# Patient Record
Sex: FEMALE | Race: WHITE | URBAN - METROPOLITAN AREA
[De-identification: names, ages, dates, MRNs, and addresses within clinical notes are randomized per-mention and may not be internally consistent; named-entity substitution may affect disease eponyms.]

---

## 2020-01-15 ENCOUNTER — APPOINTMENT (OUTPATIENT)
Dept: URBAN - METROPOLITAN AREA CLINIC 189 | Age: 50
Setting detail: DERMATOLOGY
End: 2020-01-15

## 2020-01-15 DIAGNOSIS — D485 NEOPLASM OF UNCERTAIN BEHAVIOR OF SKIN: ICD-10-CM

## 2020-01-15 DIAGNOSIS — L90.5 SCAR CONDITIONS AND FIBROSIS OF SKIN: ICD-10-CM

## 2020-01-15 PROBLEM — D48.5 NEOPLASM OF UNCERTAIN BEHAVIOR OF SKIN: Status: ACTIVE | Noted: 2020-01-15

## 2020-01-15 PROCEDURE — 11103 TANGNTL BX SKIN EA SEP/ADDL: CPT

## 2020-01-15 PROCEDURE — OTHER MIPS QUALITY: OTHER

## 2020-01-15 PROCEDURE — OTHER BIOPSY BY SHAVE METHOD: OTHER

## 2020-01-15 PROCEDURE — 99241: CPT | Mod: 25

## 2020-01-15 PROCEDURE — OTHER COUNSELING: OTHER

## 2020-01-15 PROCEDURE — 11102 TANGNTL BX SKIN SINGLE LES: CPT

## 2020-01-15 ASSESSMENT — LOCATION ZONE DERM
LOCATION ZONE: TRUNK
LOCATION ZONE: LEG

## 2020-01-15 ASSESSMENT — LOCATION SIMPLE DESCRIPTION DERM
LOCATION SIMPLE: CHEST
LOCATION SIMPLE: LEFT UPPER BACK
LOCATION SIMPLE: RIGHT THIGH
LOCATION SIMPLE: LEFT THIGH

## 2020-01-15 ASSESSMENT — LOCATION DETAILED DESCRIPTION DERM
LOCATION DETAILED: LEFT LATERAL SUPERIOR CHEST
LOCATION DETAILED: LEFT ANTERIOR DISTAL THIGH
LOCATION DETAILED: LEFT MEDIAL SUPERIOR CHEST
LOCATION DETAILED: RIGHT MEDIAL SUPERIOR CHEST
LOCATION DETAILED: LEFT SUPERIOR UPPER BACK
LOCATION DETAILED: RIGHT ANTERIOR PROXIMAL THIGH

## 2020-01-15 NOTE — PROCEDURE: BIOPSY BY SHAVE METHOD
Detail Level: Simple
Type Of Destruction Used: Electrodesiccation and Curettage
Bill 65305 For Specimen Handling/Conveyance To Laboratory?: no
Wound Care: Vaseline
Body Location Override (Optional - Billing Will Still Be Based On Selected Body Map Location If Applicable): L central chest
Additional Anesthesia Volume In Cc (Will Not Render If 0): 0
Notification Instructions: Patient will be notified of biopsy results. However, patient instructed to call the office if not contacted within 2 weeks.
Size Of Lesion In Cm: 0.9
Hemostasis: Aluminum Chloride and Electrocautery
Was A Bandage Applied: Yes
Billing Type: Third-Party Bill
Curettage Text: The wound bed was treated with curettage after the biopsy was performed.
Silver Nitrate Text: The wound bed was treated with silver nitrate after the biopsy was performed.
Anesthesia Volume In Cc (Will Not Render If 0): 0.5
Cryotherapy Text: The wound bed was treated with cryotherapy after the biopsy was performed.
Depth Of Biopsy: dermis
Biopsy Type: H and E
Consent: Written consent was obtained and risks were reviewed including but not limited to scarring, infection, bleeding, scabbing, incomplete removal, nerve damage and allergy to anesthesia.
Dressing: bandage
Electrodesiccation And Curettage Text: The wound bed was treated with electrodesiccation and curettage for three cycles after the biopsy was performed.
Electrodesiccation Text: The wound bed was treated with electrodesiccation after the biopsy was performed.
Body Location Override (Optional - Billing Will Still Be Based On Selected Body Map Location If Applicable): L lateral thigh
Post-Care Instructions: I reviewed with the patient in detail post-care instructions. Patient is to keep the biopsy site dry overnight, and then apply bacitracin twice daily until healed. Patient may apply hydrogen peroxide soaks to remove any crusting.
Hemostasis: Aluminum Chloride
Body Location Override (Optional - Billing Will Still Be Based On Selected Body Map Location If Applicable): R medial thigh
Biopsy Method: Personna blade
Size Of Lesion In Cm: 0.6
Anesthesia Type: 1% lidocaine with epinephrine and a 1:10 solution of 8.4% sodium bicarbonate
Body Location Override (Optional - Billing Will Still Be Based On Selected Body Map Location If Applicable): R central chest
Size Of Lesion In Cm: 0.7
Body Location Override (Optional - Billing Will Still Be Based On Selected Body Map Location If Applicable): Central chest

## 2020-02-27 ENCOUNTER — APPOINTMENT (OUTPATIENT)
Dept: URBAN - METROPOLITAN AREA CLINIC 189 | Age: 50
Setting detail: DERMATOLOGY
End: 2020-02-27

## 2020-02-27 PROBLEM — C44.519 BASAL CELL CARCINOMA OF SKIN OF OTHER PART OF TRUNK: Status: ACTIVE | Noted: 2020-02-27

## 2020-02-27 PROCEDURE — OTHER COUNSELING: OTHER

## 2020-02-27 PROCEDURE — OTHER EXCISION: OTHER

## 2020-02-27 PROCEDURE — 12032 INTMD RPR S/A/T/EXT 2.6-7.5: CPT

## 2020-02-27 PROCEDURE — OTHER MIPS QUALITY: OTHER

## 2020-02-27 PROCEDURE — 11601 EXC TR-EXT MAL+MARG 0.6-1 CM: CPT

## 2020-02-27 PROCEDURE — 11601 EXC TR-EXT MAL+MARG 0.6-1 CM: CPT | Mod: 76

## 2020-02-27 NOTE — PROCEDURE: EXCISION
Body Location Override (Optional - Billing Will Still Be Based On Selected Body Map Location If Applicable): L central chest

## 2020-02-27 NOTE — PROCEDURE: EXCISION
Body Location Override (Optional - Billing Will Still Be Based On Selected Body Map Location If Applicable): R central chest

## 2020-03-05 ENCOUNTER — APPOINTMENT (OUTPATIENT)
Dept: URBAN - METROPOLITAN AREA CLINIC 189 | Age: 50
Setting detail: DERMATOLOGY
End: 2020-03-05

## 2020-03-05 DIAGNOSIS — Z48.02 ENCOUNTER FOR REMOVAL OF SUTURES: ICD-10-CM

## 2020-03-05 PROCEDURE — OTHER SUTURE REMOVAL (GLOBAL PERIOD): OTHER

## 2020-03-05 PROCEDURE — 99024 POSTOP FOLLOW-UP VISIT: CPT

## 2020-03-05 PROCEDURE — OTHER COUNSELING: OTHER

## 2020-03-05 PROCEDURE — OTHER MIPS QUALITY: OTHER

## 2020-03-05 ASSESSMENT — LOCATION ZONE DERM: LOCATION ZONE: TRUNK

## 2020-03-05 ASSESSMENT — LOCATION SIMPLE DESCRIPTION DERM: LOCATION SIMPLE: CHEST

## 2020-03-05 ASSESSMENT — LOCATION DETAILED DESCRIPTION DERM
LOCATION DETAILED: LEFT LATERAL SUPERIOR CHEST
LOCATION DETAILED: RIGHT MEDIAL SUPERIOR CHEST

## 2020-03-05 NOTE — PROCEDURE: SUTURE REMOVAL (GLOBAL PERIOD)
Detail Level: Simple
Body Location Override (Optional - Billing Will Still Be Based On Selected Body Map Location If Applicable): R central chest
Add 40736 Cpt? (Important Note: In 2017 The Use Of 33115 Is Being Tracked By Cms To Determine Future Global Period Reimbursement For Global Periods): yes
Body Location Override (Optional - Billing Will Still Be Based On Selected Body Map Location If Applicable): L central chest

## 2020-06-04 ENCOUNTER — APPOINTMENT (OUTPATIENT)
Dept: URBAN - METROPOLITAN AREA CLINIC 189 | Age: 50
Setting detail: DERMATOLOGY
End: 2020-06-04

## 2020-06-04 DIAGNOSIS — L91.0 HYPERTROPHIC SCAR: ICD-10-CM

## 2020-06-04 PROBLEM — C44.719 BASAL CELL CARCINOMA OF SKIN OF LEFT LOWER LIMB, INCLUDING HIP: Status: ACTIVE | Noted: 2020-06-04

## 2020-06-04 PROBLEM — C44.519 BASAL CELL CARCINOMA OF SKIN OF OTHER PART OF TRUNK: Status: ACTIVE | Noted: 2020-06-04

## 2020-06-04 PROCEDURE — OTHER EXCISION: OTHER

## 2020-06-04 PROCEDURE — 11900 INJECT SKIN LESIONS </W 7: CPT | Mod: 59

## 2020-06-04 PROCEDURE — 11602 EXC TR-EXT MAL+MARG 1.1-2 CM: CPT | Mod: 76

## 2020-06-04 PROCEDURE — 12032 INTMD RPR S/A/T/EXT 2.6-7.5: CPT

## 2020-06-04 PROCEDURE — OTHER COUNSELING: OTHER

## 2020-06-04 PROCEDURE — 11602 EXC TR-EXT MAL+MARG 1.1-2 CM: CPT

## 2020-06-04 PROCEDURE — OTHER INTRALESIONAL KENALOG: OTHER

## 2020-06-04 PROCEDURE — OTHER MIPS QUALITY: OTHER

## 2020-06-04 ASSESSMENT — LOCATION SIMPLE DESCRIPTION DERM: LOCATION SIMPLE: CHEST

## 2020-06-04 ASSESSMENT — LOCATION DETAILED DESCRIPTION DERM
LOCATION DETAILED: RIGHT MEDIAL SUPERIOR CHEST
LOCATION DETAILED: LEFT LATERAL SUPERIOR CHEST

## 2020-06-04 ASSESSMENT — LOCATION ZONE DERM: LOCATION ZONE: TRUNK

## 2020-06-04 NOTE — PROCEDURE: EXCISION
Body Location Override (Optional - Billing Will Still Be Based On Selected Body Map Location If Applicable): L lateral thigh

## 2020-06-15 ENCOUNTER — APPOINTMENT (OUTPATIENT)
Dept: URBAN - METROPOLITAN AREA CLINIC 189 | Age: 50
Setting detail: DERMATOLOGY
End: 2020-06-15

## 2020-06-15 DIAGNOSIS — Z48.02 ENCOUNTER FOR REMOVAL OF SUTURES: ICD-10-CM

## 2020-06-15 PROCEDURE — 99024 POSTOP FOLLOW-UP VISIT: CPT

## 2020-06-15 PROCEDURE — OTHER SUTURE REMOVAL (GLOBAL PERIOD): OTHER

## 2020-06-15 PROCEDURE — OTHER MIPS QUALITY: OTHER

## 2020-06-15 PROCEDURE — OTHER COUNSELING: OTHER

## 2020-06-15 ASSESSMENT — LOCATION DETAILED DESCRIPTION DERM
LOCATION DETAILED: UPPER STERNUM
LOCATION DETAILED: LEFT ANTERIOR LATERAL PROXIMAL THIGH

## 2020-06-15 ASSESSMENT — LOCATION ZONE DERM
LOCATION ZONE: TRUNK
LOCATION ZONE: LEG

## 2020-06-15 ASSESSMENT — LOCATION SIMPLE DESCRIPTION DERM
LOCATION SIMPLE: CHEST
LOCATION SIMPLE: LEFT THIGH

## 2020-06-15 NOTE — PROCEDURE: SUTURE REMOVAL (GLOBAL PERIOD)
Add 92785 Cpt? (Important Note: In 2017 The Use Of 84024 Is Being Tracked By Cms To Determine Future Global Period Reimbursement For Global Periods): yes
Body Location Override (Optional - Billing Will Still Be Based On Selected Body Map Location If Applicable): Central chest
Body Location Override (Optional - Billing Will Still Be Based On Selected Body Map Location If Applicable): L lateral thigh
Detail Level: Simple

## 2020-06-25 ENCOUNTER — APPOINTMENT (OUTPATIENT)
Dept: URBAN - METROPOLITAN AREA CLINIC 189 | Age: 50
Setting detail: DERMATOLOGY
End: 2020-06-25

## 2020-06-25 DIAGNOSIS — Z48.02 ENCOUNTER FOR REMOVAL OF SUTURES: ICD-10-CM

## 2020-06-25 PROCEDURE — 99024 POSTOP FOLLOW-UP VISIT: CPT

## 2020-06-25 PROCEDURE — OTHER COUNSELING: OTHER

## 2020-06-25 PROCEDURE — OTHER MIPS QUALITY: OTHER

## 2020-06-25 PROCEDURE — OTHER SUTURE REMOVAL (GLOBAL PERIOD): OTHER

## 2020-06-25 ASSESSMENT — LOCATION ZONE DERM: LOCATION ZONE: TRUNK

## 2020-06-25 ASSESSMENT — LOCATION SIMPLE DESCRIPTION DERM: LOCATION SIMPLE: CHEST

## 2020-06-25 ASSESSMENT — LOCATION DETAILED DESCRIPTION DERM: LOCATION DETAILED: MIDDLE STERNUM

## 2020-06-25 NOTE — PROCEDURE: SUTURE REMOVAL (GLOBAL PERIOD)
Body Location Override (Optional - Billing Will Still Be Based On Selected Body Map Location If Applicable): central chest
Add 24318 Cpt? (Important Note: In 2017 The Use Of 65059 Is Being Tracked By Cms To Determine Future Global Period Reimbursement For Global Periods): yes
Detail Level: Simple

## 2020-07-16 ENCOUNTER — APPOINTMENT (OUTPATIENT)
Dept: URBAN - METROPOLITAN AREA CLINIC 189 | Age: 50
Setting detail: DERMATOLOGY
End: 2020-07-16

## 2020-07-16 DIAGNOSIS — L91.0 HYPERTROPHIC SCAR: ICD-10-CM

## 2020-07-16 DIAGNOSIS — Z85.828 PERSONAL HISTORY OF OTHER MALIGNANT NEOPLASM OF SKIN: ICD-10-CM

## 2020-07-16 PROBLEM — I63.50 CEREBRAL INFARCTION DUE TO UNSPECIFIED OCCLUSION OR STENOSIS OF UNSPECIFIED CEREBRAL ARTERY: Status: ACTIVE | Noted: 2020-07-16

## 2020-07-16 PROCEDURE — OTHER INTRALESIONAL KENALOG: OTHER

## 2020-07-16 PROCEDURE — OTHER TREATMENT REGIMEN: OTHER

## 2020-07-16 PROCEDURE — OTHER OBSERVATION: OTHER

## 2020-07-16 PROCEDURE — OTHER MIPS QUALITY: OTHER

## 2020-07-16 PROCEDURE — 11900 INJECT SKIN LESIONS </W 7: CPT

## 2020-07-16 PROCEDURE — OTHER COUNSELING: OTHER

## 2020-07-16 ASSESSMENT — LOCATION SIMPLE DESCRIPTION DERM
LOCATION SIMPLE: LEFT THIGH
LOCATION SIMPLE: CHEST

## 2020-07-16 ASSESSMENT — LOCATION ZONE DERM
LOCATION ZONE: TRUNK
LOCATION ZONE: LEG

## 2020-07-16 ASSESSMENT — LOCATION DETAILED DESCRIPTION DERM
LOCATION DETAILED: RIGHT MEDIAL SUPERIOR CHEST
LOCATION DETAILED: LEFT LATERAL SUPERIOR CHEST
LOCATION DETAILED: LEFT ANTERIOR LATERAL DISTAL THIGH
LOCATION DETAILED: UPPER STERNUM

## 2020-07-16 NOTE — PROCEDURE: OBSERVATION
Body Location Override (Optional - Billing Will Still Be Based On Selected Body Map Location If Applicable): L lateral thigh
Size Of Lesion In Cm (Optional): 0
Detail Level: Simple

## 2020-09-08 ENCOUNTER — APPOINTMENT (OUTPATIENT)
Dept: URBAN - METROPOLITAN AREA CLINIC 189 | Age: 50
Setting detail: DERMATOLOGY
End: 2020-09-08

## 2020-09-08 DIAGNOSIS — D22 MELANOCYTIC NEVI: ICD-10-CM

## 2020-09-08 DIAGNOSIS — L57.0 ACTINIC KERATOSIS: ICD-10-CM

## 2020-09-08 DIAGNOSIS — Z85.828 PERSONAL HISTORY OF OTHER MALIGNANT NEOPLASM OF SKIN: ICD-10-CM

## 2020-09-08 DIAGNOSIS — L91.0 HYPERTROPHIC SCAR: ICD-10-CM

## 2020-09-08 DIAGNOSIS — I83.9 ASYMPTOMATIC VARICOSE VEINS OF LOWER EXTREMITIES: ICD-10-CM

## 2020-09-08 DIAGNOSIS — L21.8 OTHER SEBORRHEIC DERMATITIS: ICD-10-CM

## 2020-09-08 DIAGNOSIS — L73.8 OTHER SPECIFIED FOLLICULAR DISORDERS: ICD-10-CM

## 2020-09-08 DIAGNOSIS — L81.4 OTHER MELANIN HYPERPIGMENTATION: ICD-10-CM

## 2020-09-08 DIAGNOSIS — D485 NEOPLASM OF UNCERTAIN BEHAVIOR OF SKIN: ICD-10-CM

## 2020-09-08 DIAGNOSIS — L91.8 OTHER HYPERTROPHIC DISORDERS OF THE SKIN: ICD-10-CM

## 2020-09-08 DIAGNOSIS — B35.1 TINEA UNGUIUM: ICD-10-CM

## 2020-09-08 DIAGNOSIS — D18.0 HEMANGIOMA: ICD-10-CM

## 2020-09-08 DIAGNOSIS — L71.8 OTHER ROSACEA: ICD-10-CM

## 2020-09-08 PROBLEM — D22.71 MELANOCYTIC NEVI OF RIGHT LOWER LIMB, INCLUDING HIP: Status: ACTIVE | Noted: 2020-09-08

## 2020-09-08 PROBLEM — D22.61 MELANOCYTIC NEVI OF RIGHT UPPER LIMB, INCLUDING SHOULDER: Status: ACTIVE | Noted: 2020-09-08

## 2020-09-08 PROBLEM — I83.93 ASYMPTOMATIC VARICOSE VEINS OF BILATERAL LOWER EXTREMITIES: Status: ACTIVE | Noted: 2020-09-08

## 2020-09-08 PROBLEM — D22.72 MELANOCYTIC NEVI OF LEFT LOWER LIMB, INCLUDING HIP: Status: ACTIVE | Noted: 2020-09-08

## 2020-09-08 PROBLEM — F32.9 MAJOR DEPRESSIVE DISORDER, SINGLE EPISODE, UNSPECIFIED: Status: ACTIVE | Noted: 2020-09-08

## 2020-09-08 PROBLEM — D48.5 NEOPLASM OF UNCERTAIN BEHAVIOR OF SKIN: Status: ACTIVE | Noted: 2020-09-08

## 2020-09-08 PROBLEM — D18.01 HEMANGIOMA OF SKIN AND SUBCUTANEOUS TISSUE: Status: ACTIVE | Noted: 2020-09-08

## 2020-09-08 PROBLEM — D22.62 MELANOCYTIC NEVI OF LEFT UPPER LIMB, INCLUDING SHOULDER: Status: ACTIVE | Noted: 2020-09-08

## 2020-09-08 PROBLEM — D22.5 MELANOCYTIC NEVI OF TRUNK: Status: ACTIVE | Noted: 2020-09-08

## 2020-09-08 PROCEDURE — OTHER TREATMENT REGIMEN: OTHER

## 2020-09-08 PROCEDURE — OTHER COUNSELING: OTHER

## 2020-09-08 PROCEDURE — OTHER MIPS QUALITY: OTHER

## 2020-09-08 PROCEDURE — 11102 TANGNTL BX SKIN SINGLE LES: CPT

## 2020-09-08 PROCEDURE — OTHER INTRALESIONAL KENALOG: OTHER

## 2020-09-08 PROCEDURE — 17000 DESTRUCT PREMALG LESION: CPT | Mod: 59

## 2020-09-08 PROCEDURE — 11103 TANGNTL BX SKIN EA SEP/ADDL: CPT

## 2020-09-08 PROCEDURE — OTHER BIOPSY BY SHAVE METHOD: OTHER

## 2020-09-08 PROCEDURE — 99214 OFFICE O/P EST MOD 30 MIN: CPT | Mod: 25

## 2020-09-08 PROCEDURE — 11900 INJECT SKIN LESIONS </W 7: CPT | Mod: 59

## 2020-09-08 PROCEDURE — OTHER LIQUID NITROGEN: OTHER

## 2020-09-08 PROCEDURE — OTHER OBSERVATION: OTHER

## 2020-09-08 ASSESSMENT — LOCATION DETAILED DESCRIPTION DERM
LOCATION DETAILED: RIGHT ANTERIOR PROXIMAL UPPER ARM
LOCATION DETAILED: RIGHT ANTERIOR DISTAL UPPER ARM
LOCATION DETAILED: LEFT FOREHEAD
LOCATION DETAILED: RIGHT GREAT TOENAIL
LOCATION DETAILED: LEFT BUTTOCK
LOCATION DETAILED: LEFT SUPERIOR UPPER BACK
LOCATION DETAILED: LEFT ANTERIOR DISTAL UPPER ARM
LOCATION DETAILED: LEFT LATERAL SUPERIOR CHEST
LOCATION DETAILED: LEFT LATERAL TRAPEZIAL NECK
LOCATION DETAILED: EPIGASTRIC SKIN
LOCATION DETAILED: LEFT INFERIOR CENTRAL MALAR CHEEK
LOCATION DETAILED: LEFT MEDIAL SUPERIOR CHEST
LOCATION DETAILED: INFERIOR THORACIC SPINE
LOCATION DETAILED: RIGHT NASOLABIAL FOLD
LOCATION DETAILED: RIGHT BUTTOCK
LOCATION DETAILED: HAIR
LOCATION DETAILED: RIGHT MEDIAL SUPERIOR CHEST
LOCATION DETAILED: LEFT ANTERIOR PROXIMAL THIGH
LOCATION DETAILED: SUBXIPHOID
LOCATION DETAILED: UPPER STERNUM
LOCATION DETAILED: LEFT ANTERIOR LATERAL DISTAL THIGH
LOCATION DETAILED: LEFT GREAT TOENAIL
LOCATION DETAILED: LEFT MEDIAL SUPERIOR EYELID
LOCATION DETAILED: RIGHT ANTERIOR PROXIMAL THIGH
LOCATION DETAILED: RIGHT LATERAL SUPERIOR EYELID
LOCATION DETAILED: LEFT VENTRAL LATERAL PROXIMAL FOREARM
LOCATION DETAILED: RIGHT MEDIAL UPPER BACK
LOCATION DETAILED: LEFT ANTERIOR PROXIMAL UPPER ARM
LOCATION DETAILED: LEFT MEDIAL FOREHEAD

## 2020-09-08 ASSESSMENT — LOCATION ZONE DERM
LOCATION ZONE: NECK
LOCATION ZONE: TOENAIL
LOCATION ZONE: FACE
LOCATION ZONE: EYELID
LOCATION ZONE: SCALP
LOCATION ZONE: LEG
LOCATION ZONE: ARM
LOCATION ZONE: LIP
LOCATION ZONE: TRUNK

## 2020-09-08 ASSESSMENT — LOCATION SIMPLE DESCRIPTION DERM
LOCATION SIMPLE: RIGHT UPPER BACK
LOCATION SIMPLE: RIGHT THIGH
LOCATION SIMPLE: CHEST
LOCATION SIMPLE: RIGHT BUTTOCK
LOCATION SIMPLE: RIGHT GREAT TOE
LOCATION SIMPLE: LEFT BUTTOCK
LOCATION SIMPLE: LEFT UPPER BACK
LOCATION SIMPLE: UPPER BACK
LOCATION SIMPLE: LEFT THIGH
LOCATION SIMPLE: LEFT CHEEK
LOCATION SIMPLE: RIGHT UPPER ARM
LOCATION SIMPLE: LEFT UPPER ARM
LOCATION SIMPLE: ABDOMEN
LOCATION SIMPLE: RIGHT LIP
LOCATION SIMPLE: RIGHT SUPERIOR EYELID
LOCATION SIMPLE: LEFT GREAT TOE
LOCATION SIMPLE: LEFT FOREARM
LOCATION SIMPLE: LEFT SUPERIOR EYELID
LOCATION SIMPLE: LEFT FOREHEAD
LOCATION SIMPLE: HAIR
LOCATION SIMPLE: POSTERIOR NECK

## 2020-09-08 NOTE — PROCEDURE: OBSERVATION
Size Of Lesion In Cm (Optional): 0
Detail Level: Simple
Morphology Per Location (Optional): Hypertrophic scar with NER
Body Location Override (Optional - Billing Will Still Be Based On Selected Body Map Location If Applicable): R central chest
Body Location Override (Optional - Billing Will Still Be Based On Selected Body Map Location If Applicable): central chest
Body Location Override (Optional - Billing Will Still Be Based On Selected Body Map Location If Applicable): L central chest
Body Location Override (Optional - Billing Will Still Be Based On Selected Body Map Location If Applicable): L lateral thigh
Body Location Override (Optional - Billing Will Still Be Based On Selected Body Map Location If Applicable): L upper back

## 2020-09-08 NOTE — PROCEDURE: BIOPSY BY SHAVE METHOD
X Size Of Lesion In Cm: 0
Bill [id number] For Specimen Handling/Conveyance To Laboratory?: no
Electrodesiccation Text: The wound bed was treated with electrodesiccation after the biopsy was performed.
Electrodesiccation And Curettage Text: The wound bed was treated with electrodesiccation and curettage for three cycles after the biopsy was performed.
Was A Bandage Applied: Yes
Information: Selecting Yes will display possible errors in your note based on the variables you have selected. This validation is only offered as a suggestion for you. PLEASE NOTE THAT THE VALIDATION TEXT WILL BE REMOVED WHEN YOU FINALIZE YOUR NOTE. IF YOU WANT TO FAX A PRELIMINARY NOTE YOU WILL NEED TO TOGGLE THIS TO 'NO' IF YOU DO NOT WANT IT IN YOUR FAXED NOTE.
Type Of Destruction Used: Electrodesiccation and Curettage
Biopsy Type: H and E
Wound Care: Vaseline
Consent: Written consent was obtained and risks were reviewed including but not limited to scarring, infection, bleeding, scabbing, incomplete removal, nerve damage and allergy to anesthesia.
Post-Care Instructions: I reviewed with the patient in detail post-care instructions. Patient is to keep the biopsy site dry overnight, and then apply bacitracin twice daily until healed. Patient may apply hydrogen peroxide soaks to remove any crusting.
Curettage Text: The wound bed was treated with curettage after the biopsy was performed.
Cryotherapy Text: The wound bed was treated with cryotherapy after the biopsy was performed.
Dressing: bandage
Notification Instructions: Patient will be notified of biopsy results. However, patient instructed to call the office if not contacted within 2 weeks.
Body Location Override (Optional - Billing Will Still Be Based On Selected Body Map Location If Applicable): R upper cutaneous lip
Depth Of Biopsy: dermis
Body Location Override (Optional - Billing Will Still Be Based On Selected Body Map Location If Applicable): L upper back inferior
Anesthesia Volume In Cc (Will Not Render If 0): 0.5
Additional Anticipated Plans: If malignant consider Mohs surgery
Body Location Override (Optional - Billing Will Still Be Based On Selected Body Map Location If Applicable): L proximal forearm
Billing Type: Third-Party Bill
Hemostasis: Aluminum Chloride and Electrocautery
Silver Nitrate Text: The wound bed was treated with silver nitrate after the biopsy was performed.
Size Of Lesion In Cm: 0.4
Detail Level: Simple
Hemostasis: Aluminum Chloride
Anesthesia Type: 1% lidocaine with epinephrine and a 1:10 solution of 8.4% sodium bicarbonate
Biopsy Method: Personna blade

## 2020-10-02 ENCOUNTER — APPOINTMENT (OUTPATIENT)
Dept: URBAN - METROPOLITAN AREA SURGERY 9 | Age: 50
Setting detail: DERMATOLOGY
End: 2020-10-02

## 2020-10-02 DIAGNOSIS — D22 MELANOCYTIC NEVI: ICD-10-CM

## 2020-10-02 PROBLEM — C44.01 BASAL CELL CARCINOMA OF SKIN OF LIP: Status: ACTIVE | Noted: 2020-10-02

## 2020-10-02 PROBLEM — D22.5 MELANOCYTIC NEVI OF TRUNK: Status: ACTIVE | Noted: 2020-10-02

## 2020-10-02 PROCEDURE — OTHER SHAVE REMOVAL: OTHER

## 2020-10-02 PROCEDURE — 17311 MOHS 1 STAGE H/N/HF/G: CPT

## 2020-10-02 PROCEDURE — 11301 SHAVE SKIN LESION 0.6-1.0 CM: CPT

## 2020-10-02 PROCEDURE — 12052 INTMD RPR FACE/MM 2.6-5.0 CM: CPT | Mod: 59

## 2020-10-02 PROCEDURE — OTHER MOHS SURGERY: OTHER

## 2020-10-02 PROCEDURE — OTHER CONSULTATION FOR MOHS SURGERY: OTHER

## 2020-10-02 PROCEDURE — OTHER RETURN TO REFERRING PROVIDER: OTHER

## 2020-10-02 ASSESSMENT — LOCATION DETAILED DESCRIPTION DERM: LOCATION DETAILED: LEFT SUPERIOR LATERAL UPPER BACK

## 2020-10-02 ASSESSMENT — LOCATION SIMPLE DESCRIPTION DERM: LOCATION SIMPLE: LEFT UPPER BACK

## 2020-10-02 ASSESSMENT — LOCATION ZONE DERM: LOCATION ZONE: TRUNK

## 2020-10-02 NOTE — PROCEDURE: SHAVE REMOVAL
Medical Necessity Clause: This procedure was medically necessary because the lesion that was treated was: atypical nevus, extending to lateral margin following initial biopsy

## 2020-10-02 NOTE — PROCEDURE: CONSULTATION FOR MOHS SURGERY
Detail Level: Detailed
Body Location Override (Optional - Billing Will Still Be Based On Selected Body Map Location If Applicable): right upper cutaneous lip
Name Of The Referring Provider For Procedure: Dr. Brown
X Size Of Lesion In Cm (Optional): 0
Incorporate Mauc In Note: Yes

## 2020-10-02 NOTE — PROCEDURE: MOHS SURGERY
Plan:     1. Completed Urine Dipstick in office today due to abdominal pain - trace Leukocytes. Ordered urine culture.    2. Start sulfamethoxazole-trimethoprim (BACTRIM DS,SEPTRA DS) 800-160 MG for Abdominal Pain.   3. Start metroNIDAZOLE (FLAGYL) 500 MG for Abdominal Pain. Do not drink alcohol with medication.   4. Complete CBC and CMP.   5. Complete CT Abdomen due to Abdominal Pain.     You may take Tylenol for pain.     Patient will be notified with test results.     Follow up on 7/19/19.    Chonodrocutaneous Helical Advancement Flap Text: The defect edges were debeveled with a #15 scalpel blade.  Given the location of the defect and the proximity to free margins a chondrocutaneous helical advancement flap was deemed most appropriate.  Using a sterile surgical marker, the appropriate advancement flap was drawn incorporating the defect and placing the expected incisions within the relaxed skin tension lines where possible.    The area thus outlined was incised deep to adipose tissue with a #15 scalpel blade.  The skin margins were undermined to an appropriate distance in all directions utilizing iris scissors.

## 2020-10-02 NOTE — PROCEDURE: SHAVE REMOVAL
Body Location Override (Optional - Billing Will Still Be Based On Selected Body Map Location If Applicable): left upper back inferior

## 2020-10-02 NOTE — PROCEDURE: MOHS SURGERY
Post-Care Instructions: Absorbable Wound care no tape provided and reviewed; specific handout given.

## 2022-09-08 NOTE — PROCEDURE: EXCISION
Cyclophosphamide Counseling:  I discussed with the patient the risks of cyclophosphamide including but not limited to hair loss, hormonal abnormalities, decreased fertility, abdominal pain, diarrhea, nausea and vomiting, bone marrow suppression and infection. The patient understands that monitoring is required while taking this medication. O-Z Plasty Text: The defect edges were debeveled with a #15 scalpel blade.  Given the location of the defect, shape of the defect and the proximity to free margins an O-Z plasty (double transposition flap) was deemed most appropriate.  Using a sterile surgical marker, the appropriate transposition flaps were drawn incorporating the defect and placing the expected incisions within the relaxed skin tension lines where possible.    The area thus outlined was incised deep to adipose tissue with a #15 scalpel blade.  The skin margins were undermined to an appropriate distance in all directions utilizing iris scissors.  Hemostasis was achieved with electrocautery.  The flaps were then transposed into place, one clockwise and the other counterclockwise, and anchored with interrupted buried subcutaneous sutures.

## 2024-10-10 NOTE — PROCEDURE: MIPS QUALITY
Left message to call back.   
Quality 226: Preventive Care And Screening: Tobacco Use: Screening And Cessation Intervention: Patient screened for tobacco use and is an ex/non-smoker
Detail Level: Generalized
Quality 110: Preventive Care And Screening: Influenza Immunization: Influenza Immunization Ordered or Recommended, but not Administered due to system reason